# Patient Record
Sex: MALE | Race: WHITE | NOT HISPANIC OR LATINO
[De-identification: names, ages, dates, MRNs, and addresses within clinical notes are randomized per-mention and may not be internally consistent; named-entity substitution may affect disease eponyms.]

---

## 2019-03-26 PROBLEM — Z00.00 ENCOUNTER FOR PREVENTIVE HEALTH EXAMINATION: Status: ACTIVE | Noted: 2019-03-26

## 2019-03-28 ENCOUNTER — APPOINTMENT (OUTPATIENT)
Dept: PLASTIC SURGERY | Facility: CLINIC | Age: 69
End: 2019-03-28
Payer: MEDICARE

## 2019-03-28 VITALS
WEIGHT: 230 LBS | SYSTOLIC BLOOD PRESSURE: 149 MMHG | HEART RATE: 66 BPM | RESPIRATION RATE: 18 BRPM | DIASTOLIC BLOOD PRESSURE: 85 MMHG | OXYGEN SATURATION: 98 % | BODY MASS INDEX: 26.61 KG/M2 | HEIGHT: 78 IN

## 2019-03-28 DIAGNOSIS — Z80.8 FAMILY HISTORY OF MALIGNANT NEOPLASM OF OTHER ORGANS OR SYSTEMS: ICD-10-CM

## 2019-03-28 PROCEDURE — 99203 OFFICE O/P NEW LOW 30 MIN: CPT

## 2019-03-28 NOTE — CONSULT LETTER
[Consult Letter:] : I had the pleasure of evaluating your patient, [unfilled]. [Please see my note below.] : Please see my note below. [Consult Closing:] : Thank you very much for allowing me to participate in the care of this patient.  If you have any questions, please do not hesitate to contact me. [Sincerely,] : Sincerely, [FreeTextEntry2] : Dr. Maynor Rosa [FreeTextEntry3] : Oren Lerman, MD FACS\par Aesthetic & Reconstructive Plastic Surgery\par Associate Professor of Surgery, [employer] [facility] [employer] [facility] [employer] at [facility]\par Director - Breast Aesthetic and Reconstruction Fellowship, [facility]\par Past President, [place] [employer]\par Office Tel [phone]\par Office Fax [phone]\par [email or web address]\par

## 2019-03-28 NOTE — ASSESSMENT
[FreeTextEntry1] : I discussed the options for reconstruction including;\par 1) Rotational scalp flap\par 2) MVFF radial forearm flap\par 3) Local wound care with possible repeat hyperbaric oxygen\par \par He will first need to be re-evaluated by Dr. Verdin to rule out recurrent Ca. Any reconstructive surgery would require wide debridement of any nonviable tissue including possibly the underlying bone which may necessitate cranioplasty reconstruction in conjunction with Head and Neck surgery. the surrounding skin graft is very thin and adherent to the underlying bone and the interface between any flap and this skin would be at high risk for delayed healing and breakdown and therefore a local flap rotation would likely not heal necessitating excision of the entire area 7cm x 5cm and this would require MVFF transfer - likely radial forearm flap. Furthermore it would require stopping anticoagulation perioperatively with the associated cardiac risks. 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 There is no sign of any active infection, collection or purulence.\par \par Before going ahead with major reconstruction I would recommend trial with local care as he has been stable for 5 years and there is no major change in his underlying condition. This will not burn any bridges and allow him to consider all options available. I recommended [facility] wound care center and Dr. Rebel Oh where he can be under the care of both Dr. Verdin and Dr. Oh.

## 2019-03-28 NOTE — HISTORY OF PRESENT ILLNESS
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 in 2008 he had left frontal scalp / forehead SCC treated with excision / skin graft and adjuvant radiation by Dr. Kevan Verdin. in 2013 he developed wound breakdown of the skin graft area with exposed calvarium ~ 2cm x 1cm and after consultation by Dr. Kevan Fulton who discussed reconstruction with MVFF however given the risks and alternatives and the fact that his mother had a stroke after stopping coumadin with A-fib for elective surgery the patient elected to treat it with local wound care and hyperbaric oxygen. the wound never healed and he has been unchanged for the past 5 years with exposed calvarium. 2 weeks ago a small fragment of bone - presumably the outer table broke off. CT scan demonstrates intact inner table of the frontal bone. wants to discuss reconstructive options.

## 2019-04-30 ENCOUNTER — APPOINTMENT (OUTPATIENT)
Dept: PLASTIC SURGERY | Facility: CLINIC | Age: 69
End: 2019-04-30

## 2020-07-21 ENCOUNTER — APPOINTMENT (OUTPATIENT)
Dept: PLASTIC SURGERY | Facility: CLINIC | Age: 70
End: 2020-07-21
Payer: MEDICARE

## 2020-07-21 VITALS — HEIGHT: 78 IN | WEIGHT: 242 LBS | OXYGEN SATURATION: 97 % | BODY MASS INDEX: 28 KG/M2 | HEART RATE: 61 BPM

## 2020-07-21 DIAGNOSIS — S01.00XA UNSPECIFIED OPEN WOUND OF SCALP, INITIAL ENCOUNTER: ICD-10-CM

## 2020-07-21 PROCEDURE — 99213 OFFICE O/P EST LOW 20 MIN: CPT

## 2020-07-21 RX ORDER — AMLODIPINE BESYLATE 5 MG/1
TABLET ORAL
Refills: 0 | Status: ACTIVE | COMMUNITY

## 2020-07-21 RX ORDER — SOTALOL HYDROCHLORIDE 120 MG/1
TABLET ORAL
Refills: 0 | Status: ACTIVE | COMMUNITY

## 2020-07-21 RX ORDER — WARFARIN SODIUM 6 MG/1
TABLET ORAL
Refills: 0 | Status: ACTIVE | COMMUNITY

## 2020-07-21 RX ORDER — PANTOPRAZOLE SODIUM 20 MG/1
TABLET, DELAYED RELEASE ORAL
Refills: 0 | Status: ACTIVE | COMMUNITY

## 2020-07-21 NOTE — ASSESSMENT
[FreeTextEntry1] : I recommended seeking care at a dedicated wound care center and possible HBO and conservative treatment. \par

## 2020-07-21 NOTE — PHYSICAL EXAM
[de-identified] : left forhead at the hair line border between the native skin and the grafted skin there is a 3cm x 2cm full thickness wound partially granulated - no purulence or fluctuance - no sign of exposed bone, no sign of infection

## 2020-07-21 NOTE — CONSULT LETTER
[Consult Letter:] : I had the pleasure of evaluating your patient, [unfilled]. [Consult Closing:] : Thank you very much for allowing me to participate in the care of this patient.  If you have any questions, please do not hesitate to contact me. [Sincerely,] : Sincerely, [Please see my note below.] : Please see my note below. [FreeTextEntry2] : Dr. Maynor Rosa [FreeTextEntry3] : Oren Lerman, MD FACS\par Aesthetic & Reconstructive Plastic Surgery\par Associate Professor of Surgery, [employer] [facility] [employer] at [facility]\par Director - Breast Aesthetic and Reconstruction Fellowship, [facility]\par Past President, [place] [employer]\par Office Tel [phone]\par Office Fax [phone]\par [email or web address]\par

## 2020-07-21 NOTE — HISTORY OF PRESENT ILLNESS
[FreeTextEntry1] : 67 yo male present to discuss reconstruction options for open scalp / forhead wound  - previously referred to wound care center for possible hyperbaric Oxygen +/- reconstruction but he did not follow through due to Covid - he has had 3 consultations / opinions at 3 different wound care centers and been offered HBO therapy. 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 in 2008 he had left frontal scalp / forehead SCC treated with excision / skin graft and adjuvant radiation by Dr. Kevan Verdin. in 2013 he developed wound breakdown of the skin graft area with exposed calvarium ~ 2cm x 1cm and after consultation by Dr. Kevan Fulton who discussed reconstruction with MVFF however given the risks and alternatives and the fact that his mother had a stroke after stopping coumadin with A-fib for elective surgery the patient elected to treat it with local wound care and hyperbaric oxygen. the wound never healed and he has been unchanged for the past 5 years with exposed calvarium. 2 weeks ago a small fragment of bone - presumably the outer table broke off.